# Patient Record
(demographics unavailable — no encounter records)

---

## 2024-10-10 NOTE — PLAN
[FreeTextEntry1] : Mr. NOVA MARIE 74 year male with a PMH CAD, s/p PCI x 1, h/o kidney stone, RENEE on a CPAP, prediabetis, h/o thyroid nodule, Sjogren disease, h/o TIA present to the office for a f/u. F/u exam is performed. Recommend to do a blood test today, further management will depend on the blood test results.  BP is well controlled, continue norvasc 5 mg, metoprolol Hyperlipidemia continue crestor 40mg CAD, s/p PCI continue metoprolol, crestor, plavix BPH continue flomax Thyroid nodule f/u with endo, repeat a thyroid u/s on 02/2025 For a tearing of the eyes recommend  to use eye drops Patient will get a flu vaccine today. Side effect of the vaccine explained to the patient prior. Patient denies allergy to the eggs  RTC to f/u in 2 wks. Patient is verbalized understanding

## 2024-10-10 NOTE — HISTORY OF PRESENT ILLNESS
[FreeTextEntry1] : F/u exam [de-identified] : Mr. NOVA MARIE 74 year male with a PMH CAD, s/p PCI x 1, h/o kidney stone, RENEE on a CPAP,  Sjogren's syndrome, prediabetes, h/o thyroid nodule,  h/o TIA present to the office for a f/u. Patient feels good, denies complaints at present time. Wants to get flu shot today.

## 2024-10-10 NOTE — REVIEW OF SYSTEMS
[Negative] : Heme/Lymph [FreeTextEntry3] : c/o tearing of the eyes [FreeTextEntry4] : c/o decrease hearing [de-identified] : chronic hives

## 2025-01-17 NOTE — PHYSICAL EXAM
[General Appearance - Alert] : alert [General Appearance - In No Acute Distress] : in no acute distress [Sclera] : the sclera and conjunctiva were normal [Outer Ear] : the ears and nose were normal in appearance [Oropharynx] : the oropharynx was normal [Neck Appearance] : the appearance of the neck was normal [Neck Cervical Mass (___cm)] : no neck mass was observed [Jugular Venous Distention Increased] : there was no jugular-venous distention [Thyroid Diffuse Enlargement] : the thyroid was not enlarged [Thyroid Nodule] : there were no palpable thyroid nodules [Auscultation Breath Sounds / Voice Sounds] : lungs were clear to auscultation bilaterally [Heart Rate And Rhythm] : heart rate was normal and rhythm regular [Heart Sounds] : normal S1 and S2 [Heart Sounds Gallop] : no gallops [Murmurs] : no murmurs [Heart Sounds Pericardial Friction Rub] : no pericardial rub [Edema] : there was no peripheral edema [Bowel Sounds] : normal bowel sounds [Abdomen Soft] : soft [Abdomen Tenderness] : non-tender [Abdomen Mass (___ Cm)] : no abdominal mass palpated [Cervical Lymph Nodes Enlarged Posterior Bilaterally] : posterior cervical [Supraclavicular Lymph Nodes Enlarged Bilaterally] : supraclavicular [Cervical Lymph Nodes Enlarged Anterior Bilaterally] : anterior cervical [No Spinal Tenderness] : no spinal tenderness [Skin Color & Pigmentation] : normal skin color and pigmentation [Skin Turgor] : normal skin turgor [] : no rash [No Focal Deficits] : no focal deficits [Oriented To Time, Place, And Person] : oriented to person, place, and time [Impaired Insight] : insight and judgment were intact [Affect] : the affect was normal [FreeTextEntry1] : No synovitis;  (+)multiple B/L Heberden's nodes;  (+)tenderness in B/L1st CMC's (R>L);  B/L knees w/ mild crepitus;  normal ROM in rest of joints

## 2025-01-17 NOTE — HISTORY OF PRESENT ILLNESS
[Skin Lesions] : skin lesions [Dry Mouth] : dry mouth [Arthralgias] : arthralgias [Dry Eyes] : dry eyes [FreeTextEntry1] : 1/17/2025:  Pt reports that he experienced severe pain/swelling in his left big toe.  He saw his podiatrist, who prescribed prednisone.  The flare resolved, and he was started on allopurinol 300mg daily.  He soon experienced a flare in his right big toe, for which he was treated with another course of prednisone.  Otherwise, feeling fine overall since last visit.  Still w/ dry eyes - stable / improves w/ Systane drops.  Also still w/ pain in his B/L 1st CMC's and knees, unchanged - occurs only w/ activity.  No other new complaints. 9/20/2024:  Still feeling 'the same" overall.  Continues to experience dry eyes, unchanged.  Also still w/ pain in his B/L 1st CMC's and B/L knees, unchanged.  No new complaints. 5/10/2024:  Feeling OK / "the same" overall.  Still w/ dry eyes, unchanged.  Also still w/ B/L knee pain (R>L) - he saw orthopedics, who ordered an MRI which showed a meniscal tear.  Also still w/right wrist pain, unchanged.  Urticaria remains fairly well controlled.  No new complaints. 1/10/2024:  Still w/ itchiness and excessive tearing in his eyes, though now w/ good days and bad days. He did not start Tyrvaya as the copay was too high.  No dry mouth.   Still w/ joint pains, worst in his knees (R>L).  Also still w/right wrist pain, unchanged.  Urticaria remains fairly well controlled.   8/16/2023:  Still w/ urticaria, unchanged. No improvement with Cellcept.  Episodes typically last about 2 hours then resolved.  Now reports eyes and mouth have not been dry.  Still w/ joint pains, unchanged, including his right shoulder pain and thumbs. [Anorexia] : no anorexia [Weight Loss] : no weight loss [Malaise] : no malaise [Fever] : no fever [Chills] : no chills [Malar Facial Rash] : no malar facial rash [Skin Nodules] : no skin nodules [Oral Ulcers] : no oral ulcers [Cough] : no cough [Dysphonia] : no dysphonia [Dysphagia] : no dysphagia [Shortness of Breath] : no shortness of breath [Chest Pain] : no chest pain [Joint Swelling] : no joint swelling [Joint Warmth] : no joint warmth [Joint Deformity] : no joint deformity [Decreased ROM] : no decreased range of motion [Morning Stiffness] : no morning stiffness [Falls] : no falls [Difficulty Standing] : no difficulty standing [Difficulty Walking] : no difficulty walking [Dyspnea] : no dyspnea [Myalgias] : no myalgias [Muscle Weakness] : no muscle weakness [Muscle Spasms] : no muscle spasms [Muscle Cramping] : no muscle cramping [Visual Changes] : no visual changes [Eye Pain] : no eye pain [Eye Redness] : no eye redness

## 2025-01-17 NOTE — ASSESSMENT
[FreeTextEntry1] : 75 year old male with: 1) Sjogren's Syndrome: Stable - dry eyes improve w/ Systane drops.  Had prescribed Tyrvaya, but pt says the copay is too high, and no pt assistance available  - Cont Systane and/or artificial tears prn  - Suggested Restasis but pt declined.  - Ophtho f/u   - sugar-free lemon/lime sucking candies and/or chewing gum  - Biotene prn   - Flu vaccine (10/24) UTD. 2) Chronic urticaria: previously suspected may be secondary to Sjogren's, but no improvement on several DMARD's, including Plaquenil, SSZ, and Cellcept. Now well controlled on antihistamines.  - Cont Allegra / Zyrtec 3) OA of hands (worst in left 1st CMC) and knees:  - Reiterated importance of exercise  - Tylenol prn (avoiding NSAID's as pt on ASA/Plavix).  - warm compresses  - OTC topical analgesics  - joint protection techniques  - Knee braces 4) Right shoulder pain: likely was overlap of OA and RTC tendinopathy. Now virtually resolved s/p shoulder replacement surgery  - Reiterated importance of shoulder exercises  - Analgesia as above. 5)  Gout:  pt s/p 2 recent flares in his feet   - Cont alopurinol 400mg daily for now and will titrate to goal of uric acid < 6.     - Recommended colchicine 0.6mg daily for now as PPX.   - Check labs, including uric acid.

## 2025-02-24 NOTE — PLAN
[FreeTextEntry1] : Mr. NOVA MARIE 74 year male with a PMH CAD, s/p PCI x 1, h/o kidney stone, RENEE on a CPAP, prediabetis, h/o thyroid nodule, Sjogren disease, h/o TIA present to the office for a f/u. F/u exam is performed. Recommend to do a blood test today, further management will depend on the blood test results.  BP is well controlled, continue norvasc 5 mg, metoprolol Hyperlipidemia continue crestor 40mg CAD, s/p PCI continue metoprolol, crestor, plavix BPH continue flomax Thyroid nodule f/u with endo, repeat a thyroid u/s  Gout continue allopurinol 400mg qd,  colchicine 0.6mg daily  RTC to f/u in 2 wks. Patient is verbalized understanding

## 2025-03-04 NOTE — HISTORY OF PRESENT ILLNESS
[de-identified] : Patient with CIU - treated with Allegra and Zyrtec - MAURICE on ipratropium - one episode of gout - now taking allopurinol - uric acid levels are normal.   Hives well controlled.

## 2025-03-04 NOTE — ASSESSMENT
[FreeTextEntry1] : Chronic urticaria:  Continue Allegra 180 mg QAM x 6 months Stop Zyrtec 10 mg QHS   MAURICE:  Ipratropium nasal spray TID prn   RV prn symptoms

## 2025-03-04 NOTE — PHYSICAL EXAM
[Alert] : alert [Well Nourished] : well nourished [No Acute Distress] : no acute distress [No Neck Mass] : no neck mass was observed [No LAD] : no lymphadenopathy [No Retractions] : no retractions [Wheezing] : no wheezing was heard [Normal Rate] : heart rate was normal  [Normal S1, S2] : normal S1 and S2 [Regular Rhythm] : with a regular rhythm [Normal Cervical Lymph Nodes] : cervical [Urticaria] : urticaria [Normal Mood] : mood was normal [Judgment and Insight Age Appropriate] : judgement and insight is age appropriate

## 2025-05-21 NOTE — PHYSICAL EXAM
[General Appearance - Alert] : alert [General Appearance - In No Acute Distress] : in no acute distress [Sclera] : the sclera and conjunctiva were normal [Outer Ear] : the ears and nose were normal in appearance [Oropharynx] : the oropharynx was normal [Neck Appearance] : the appearance of the neck was normal [Neck Cervical Mass (___cm)] : no neck mass was observed [Jugular Venous Distention Increased] : there was no jugular-venous distention [Thyroid Diffuse Enlargement] : the thyroid was not enlarged [Thyroid Nodule] : there were no palpable thyroid nodules [Auscultation Breath Sounds / Voice Sounds] : lungs were clear to auscultation bilaterally [Heart Rate And Rhythm] : heart rate was normal and rhythm regular [Heart Sounds] : normal S1 and S2 [Heart Sounds Gallop] : no gallops [Murmurs] : no murmurs [Heart Sounds Pericardial Friction Rub] : no pericardial rub [Edema] : there was no peripheral edema [Bowel Sounds] : normal bowel sounds [Abdomen Soft] : soft [Abdomen Tenderness] : non-tender [Abdomen Mass (___ Cm)] : no abdominal mass palpated [Cervical Lymph Nodes Enlarged Posterior Bilaterally] : posterior cervical [Cervical Lymph Nodes Enlarged Anterior Bilaterally] : anterior cervical [Supraclavicular Lymph Nodes Enlarged Bilaterally] : supraclavicular [No Spinal Tenderness] : no spinal tenderness [Skin Color & Pigmentation] : normal skin color and pigmentation [Skin Turgor] : normal skin turgor [] : no rash [No Focal Deficits] : no focal deficits [Oriented To Time, Place, And Person] : oriented to person, place, and time [Impaired Insight] : insight and judgment were intact [Affect] : the affect was normal [FreeTextEntry1] : No synovitis;  (+)multiple B/L Heberden's nodes;  (+)tenderness in B/L1st CMC's (R>L);  B/L knees w/ mild crepitus;  normal ROM in rest of joints

## 2025-05-21 NOTE — HISTORY OF PRESENT ILLNESS
[Skin Lesions] : skin lesions [Dry Mouth] : dry mouth [Arthralgias] : arthralgias [Dry Eyes] : dry eyes [FreeTextEntry1] : 5/21/2025:  Feeling fine since last visit.  Still w/ dry eyes - stable / improves w/ Systane drops. Also still w/ mild pain in his B/L 1st CMC's.  No gout flares since last visit.  No new complaints. 1/17/2025:  Pt reports that he experienced severe pain/swelling in his left big toe.  He saw his podiatrist, who prescribed prednisone.  The flare resolved, and he was started on allopurinol 300mg daily.  He soon experienced a flare in his right big toe, for which he was treated with another course of prednisone.  Otherwise, feeling fine overall since last visit.  Still w/ dry eyes - stable / improves w/ Systane drops.  Also still w/ pain in his B/L 1st CMC's and knees, unchanged - occurs only w/ activity.  No other new complaints. 9/20/2024:  Still feeling 'the same" overall.  Continues to experience dry eyes, unchanged.  Also still w/ pain in his B/L 1st CMC's and B/L knees, unchanged.  No new complaints. 5/10/2024:  Feeling OK / "the same" overall.  Still w/ dry eyes, unchanged.  Also still w/ B/L knee pain (R>L) - he saw orthopedics, who ordered an MRI which showed a meniscal tear.  Also still w/right wrist pain, unchanged.  Urticaria remains fairly well controlled.  No new complaints. 1/10/2024:  Still w/ itchiness and excessive tearing in his eyes, though now w/ good days and bad days. He did not start Tyrvaya as the copay was too high.  No dry mouth.   Still w/ joint pains, worst in his knees (R>L).  Also still w/right wrist pain, unchanged.  Urticaria remains fairly well controlled.   8/16/2023:  Still w/ urticaria, unchanged. No improvement with Cellcept.  Episodes typically last about 2 hours then resolved.  Now reports eyes and mouth have not been dry.  Still w/ joint pains, unchanged, including his right shoulder pain and thumbs. [Anorexia] : no anorexia [Weight Loss] : no weight loss [Malaise] : no malaise [Fever] : no fever [Chills] : no chills [Malar Facial Rash] : no malar facial rash [Skin Nodules] : no skin nodules [Oral Ulcers] : no oral ulcers [Cough] : no cough [Dysphonia] : no dysphonia [Dysphagia] : no dysphagia [Shortness of Breath] : no shortness of breath [Chest Pain] : no chest pain [Joint Swelling] : no joint swelling [Joint Warmth] : no joint warmth [Joint Deformity] : no joint deformity [Decreased ROM] : no decreased range of motion [Morning Stiffness] : no morning stiffness [Falls] : no falls [Difficulty Standing] : no difficulty standing [Difficulty Walking] : no difficulty walking [Dyspnea] : no dyspnea [Myalgias] : no myalgias [Muscle Weakness] : no muscle weakness [Muscle Spasms] : no muscle spasms [Muscle Cramping] : no muscle cramping [Visual Changes] : no visual changes [Eye Pain] : no eye pain [Eye Redness] : no eye redness

## 2025-05-21 NOTE — ASSESSMENT
Returned call to patient advising that glimepiride and dexcom sensor were sent to pharmacy.   [FreeTextEntry1] : 75 year old male with: 1) Sjogren's Syndrome: Stable - dry eyes improve w/ Systane drops.  Had prescribed Tyrvaya, but pt says the copay is too high, and no pt assistance available  - Cont Systane and/or artificial tears prn  - Suggested Restasis but pt declined.  - Ophtho f/u   - sugar-free lemon/lime sucking candies and/or chewing gum  - Biotene prn   - Flu vaccine (10/24) UTD.  - Check labs. 2) Chronic urticaria: previously suspected may be secondary to Sjogren's, but no improvement on several DMARD's, including Plaquenil, SSZ, and Cellcept. Now well controlled on antihistamines.  - Cont Allegra / Zyrtec 3) OA of hands (worst in left 1st CMC) and knees:  - Reiterated importance of exercise  - Tylenol prn (avoiding NSAID's as pt on ASA/Plavix).  - warm compresses  - OTC topical analgesics  - joint protection techniques  - Knee braces 4) Right shoulder pain: likely was overlap of OA and RTC tendinopathy. Now virtually resolved s/p shoulder replacement surgery  - Reiterated importance of shoulder exercises  - Analgesia as above. 5)  Gout:  pt s/p 2 recent flares in his feet.  Uric acid 2.9   - Decrease alopurinol to 300mg daily.     - Cont colchicine 0.6mg daily for now as PPX.   - Check labs, including uric acid.